# Patient Record
(demographics unavailable — no encounter records)

---

## 2024-11-01 NOTE — HISTORY OF PRESENT ILLNESS
[de-identified] : Routine postoperative follow-up status post emergent laparoscopic appendectomy.  Patient returns with unremarkable postoperative course

## 2024-11-01 NOTE — PHYSICAL EXAM
[Normal Breath Sounds] : Normal breath sounds [Normal Heart Sounds] : normal heart sounds [Normal Rate and Rhythm] : normal rate and rhythm [No Rash or Lesion] : No rash or lesion [Alert] : alert [Oriented to Person] : oriented to person [Oriented to Place] : oriented to place [Oriented to Time] : oriented to time [Calm] : calm [de-identified] : Healthy-appearing young gentleman in no acute distress [de-identified] : KALLIE VARELA, ADRIANNA [de-identified] : Soft, nontender nondistended, positive bowel sounds in all four quads.  No hernia or masses.  Surgical incisions remain well approximated and healing appropriately without erythema, induration or fluctuance. [de-identified] : Ambulating without difficulty or assistance

## 2024-11-01 NOTE — ASSESSMENT
[FreeTextEntry1] : Routine and uncomplicated postoperative follow-up status post laparoscopic appendectomy for acute appendicitis. Patient presents today with no adverse events or symptoms Abdomen remains soft, nontender nondistended, positive bowel sounds in all four quads.  No hernia or masses.  Surgical incisions remain well approximated and healing appropriately without erythema, induration or fluctuance.  Pathology reviewed and findings discussed with the patient.  Severe acute necrotizing appendicitis periappendicitis and peritonitis with reactive lymph node.  These findings are benign and require no further surgical intervention  Postoperative instructions have been provided including avoidance of heavy lifting and strenuous activities in excess of 20-25 pounds for duration of 4-6 weeks. The patient may shower keeping the incisions clean, dry, covered as needed.  Follow-up as needed

## 2024-11-01 NOTE — CONSULT LETTER
[Dear  ___] : Dear  [unfilled], [Courtesy Letter:] : I had the pleasure of seeing your patient, [unfilled], in my office today. [Please see my note below.] : Please see my note below. [Consult Closing:] : Thank you very much for allowing me to participate in the care of this patient.  If you have any questions, please do not hesitate to contact me. [Sincerely,] : Sincerely, [FreeTextEntry3] : Luther Acosta MD [facility] Minimally Invasive, Robotic, General & Bariatric Surgery Chair, Dept of Surgery, [facility] Physician [address] [address] P: [phone] F: [phone]

## 2024-12-04 NOTE — PHYSICAL EXAM
[Normal Breath Sounds] : Normal breath sounds [Normal Heart Sounds] : normal heart sounds [Normal Rate and Rhythm] : normal rate and rhythm [Alert] : alert [Oriented to Person] : oriented to person [Oriented to Place] : oriented to place [Oriented to Time] : oriented to time [Calm] : calm [de-identified] : Healthy-appearing young gentleman in no acute distress [de-identified] : KALLIE VARELA, ADRIANNA [de-identified] : Soft, nontender nondistended, positive bowel sounds in all four quads.  No hernia or masses.  Surgical incisions remain well approximated however the left lower quadrant incision demonstrates a small suture granuloma with hypertrophic granulation tissue. [de-identified] : Ambulating without difficulty or assistance [de-identified] : As noted in abdominal findings

## 2024-12-04 NOTE — ASSESSMENT
[FreeTextEntry1] : Patient with slight dehiscence of the left lower abdominal skin incision.  This is likely secondary to a suture granuloma as a demonstrates proud flesh.  The wound was closely inspected.  Suture granuloma was identified elevated with tweezers and transected and removed with suture scissors.  Wound was cleaned with normal saline Steri-Strip applied and dressed simply with single Band-Aid.  Local wound care instructions have been provided.  Patient may remove the bandages after several days wash the incisions gently with soap and water.  Apply topical bacitracin ointment should the wound open up once again.  Follow-up with me as needed

## 2024-12-04 NOTE — HISTORY OF PRESENT ILLNESS
[de-identified] : Patient presents for evaluation his left lower abdominal incision has dehisced.  Patient was seen in the emergency room no signs of infection.